# Patient Record
Sex: FEMALE | Race: BLACK OR AFRICAN AMERICAN | NOT HISPANIC OR LATINO
[De-identification: names, ages, dates, MRNs, and addresses within clinical notes are randomized per-mention and may not be internally consistent; named-entity substitution may affect disease eponyms.]

---

## 2017-01-09 ENCOUNTER — APPOINTMENT (OUTPATIENT)
Dept: OTOLARYNGOLOGY | Facility: CLINIC | Age: 62
End: 2017-01-09

## 2017-01-09 VITALS
HEART RATE: 94 BPM | TEMPERATURE: 98.5 F | SYSTOLIC BLOOD PRESSURE: 110 MMHG | OXYGEN SATURATION: 97 % | DIASTOLIC BLOOD PRESSURE: 79 MMHG

## 2017-01-09 DIAGNOSIS — R22.1 LOCALIZED SWELLING, MASS AND LUMP, NECK: ICD-10-CM

## 2017-01-09 DIAGNOSIS — E04.2 NONTOXIC MULTINODULAR GOITER: ICD-10-CM

## 2017-01-17 ENCOUNTER — OUTPATIENT (OUTPATIENT)
Dept: OUTPATIENT SERVICES | Facility: HOSPITAL | Age: 62
LOS: 1 days | End: 2017-01-17
Payer: SELF-PAY

## 2017-01-17 DIAGNOSIS — C73 MALIGNANT NEOPLASM OF THYROID GLAND: ICD-10-CM

## 2017-01-17 PROCEDURE — 88321 CONSLTJ&REPRT SLD PREP ELSWR: CPT

## 2017-01-18 LAB — NON-GYNECOLOGICAL CYTOLOGY STUDY: SIGNIFICANT CHANGE UP

## 2017-01-31 ENCOUNTER — RESULT REVIEW (OUTPATIENT)
Age: 62
End: 2017-01-31

## 2017-01-31 VITALS
RESPIRATION RATE: 16 BRPM | TEMPERATURE: 97 F | HEART RATE: 60 BPM | HEIGHT: 65 IN | SYSTOLIC BLOOD PRESSURE: 130 MMHG | WEIGHT: 167.55 LBS | OXYGEN SATURATION: 96 % | DIASTOLIC BLOOD PRESSURE: 80 MMHG

## 2017-02-01 ENCOUNTER — APPOINTMENT (OUTPATIENT)
Dept: OTOLARYNGOLOGY | Facility: HOSPITAL | Age: 62
End: 2017-02-01

## 2017-02-01 ENCOUNTER — OUTPATIENT (OUTPATIENT)
Dept: OUTPATIENT SERVICES | Facility: HOSPITAL | Age: 62
LOS: 1 days | Discharge: HOME | End: 2017-02-01
Payer: COMMERCIAL

## 2017-02-01 DIAGNOSIS — E04.2 NONTOXIC MULTINODULAR GOITER: ICD-10-CM

## 2017-02-01 DIAGNOSIS — Z90.710 ACQUIRED ABSENCE OF BOTH CERVIX AND UTERUS: Chronic | ICD-10-CM

## 2017-02-01 DIAGNOSIS — J38.7 OTHER DISEASES OF LARYNX: ICD-10-CM

## 2017-02-01 DIAGNOSIS — Z98.890 OTHER SPECIFIED POSTPROCEDURAL STATES: Chronic | ICD-10-CM

## 2017-02-01 DIAGNOSIS — C73 MALIGNANT NEOPLASM OF THYROID GLAND: ICD-10-CM

## 2017-02-01 LAB — PTH-INTACT IO % DIF SERPL: 45 PG/ML — SIGNIFICANT CHANGE UP (ref 8.5–72.5)

## 2017-02-01 PROCEDURE — 60252 REMOVAL OF THYROID: CPT

## 2017-02-01 PROCEDURE — 60500 EXPLORE PARATHYROID GLANDS: CPT | Mod: 59

## 2017-02-01 RX ORDER — HEPARIN SODIUM 5000 [USP'U]/ML
5000 INJECTION INTRAVENOUS; SUBCUTANEOUS EVERY 8 HOURS
Qty: 0 | Refills: 0 | Status: DISCONTINUED | OUTPATIENT
Start: 2017-02-01 | End: 2017-02-02

## 2017-02-01 RX ORDER — BENZOCAINE AND MENTHOL 5; 1 G/100ML; G/100ML
1 LIQUID ORAL
Qty: 0 | Refills: 0 | Status: DISCONTINUED | OUTPATIENT
Start: 2017-02-01 | End: 2017-02-02

## 2017-02-01 RX ORDER — ACETAMINOPHEN WITH CODEINE 300MG-30MG
1 TABLET ORAL EVERY 4 HOURS
Qty: 0 | Refills: 0 | Status: DISCONTINUED | OUTPATIENT
Start: 2017-02-01 | End: 2017-02-01

## 2017-02-01 RX ORDER — ONDANSETRON 8 MG/1
4 TABLET, FILM COATED ORAL EVERY 6 HOURS
Qty: 0 | Refills: 0 | Status: DISCONTINUED | OUTPATIENT
Start: 2017-02-01 | End: 2017-02-02

## 2017-02-01 RX ORDER — ACETAMINOPHEN WITH CODEINE 300MG-30MG
2 TABLET ORAL EVERY 4 HOURS
Qty: 0 | Refills: 0 | Status: DISCONTINUED | OUTPATIENT
Start: 2017-02-01 | End: 2017-02-01

## 2017-02-01 RX ORDER — SODIUM CHLORIDE 9 MG/ML
1000 INJECTION, SOLUTION INTRAVENOUS
Qty: 0 | Refills: 0 | Status: DISCONTINUED | OUTPATIENT
Start: 2017-02-01 | End: 2017-02-02

## 2017-02-01 RX ORDER — ACETAMINOPHEN 500 MG
1000 TABLET ORAL ONCE
Qty: 0 | Refills: 0 | Status: DISCONTINUED | OUTPATIENT
Start: 2017-02-01 | End: 2017-02-01

## 2017-02-01 RX ORDER — HYDROMORPHONE HYDROCHLORIDE 2 MG/ML
0.5 INJECTION INTRAMUSCULAR; INTRAVENOUS; SUBCUTANEOUS
Qty: 0 | Refills: 0 | Status: DISCONTINUED | OUTPATIENT
Start: 2017-02-01 | End: 2017-02-01

## 2017-02-01 RX ORDER — MORPHINE SULFATE 50 MG/1
2 CAPSULE, EXTENDED RELEASE ORAL EVERY 4 HOURS
Qty: 0 | Refills: 0 | Status: DISCONTINUED | OUTPATIENT
Start: 2017-02-01 | End: 2017-02-02

## 2017-02-01 RX ADMIN — HYDROMORPHONE HYDROCHLORIDE 0.5 MILLIGRAM(S): 2 INJECTION INTRAMUSCULAR; INTRAVENOUS; SUBCUTANEOUS at 17:55

## 2017-02-01 RX ADMIN — BENZOCAINE AND MENTHOL 1 LOZENGE: 5; 1 LIQUID ORAL at 22:58

## 2017-02-01 RX ADMIN — MORPHINE SULFATE 2 MILLIGRAM(S): 50 CAPSULE, EXTENDED RELEASE ORAL at 20:01

## 2017-02-01 RX ADMIN — HEPARIN SODIUM 5000 UNIT(S): 5000 INJECTION INTRAVENOUS; SUBCUTANEOUS at 22:52

## 2017-02-01 RX ADMIN — HYDROMORPHONE HYDROCHLORIDE 0.5 MILLIGRAM(S): 2 INJECTION INTRAMUSCULAR; INTRAVENOUS; SUBCUTANEOUS at 18:36

## 2017-02-01 RX ADMIN — MORPHINE SULFATE 2 MILLIGRAM(S): 50 CAPSULE, EXTENDED RELEASE ORAL at 22:20

## 2017-02-01 RX ADMIN — BENZOCAINE AND MENTHOL 1 LOZENGE: 5; 1 LIQUID ORAL at 20:14

## 2017-02-01 RX ADMIN — ONDANSETRON 4 MILLIGRAM(S): 8 TABLET, FILM COATED ORAL at 22:48

## 2017-02-01 NOTE — PROGRESS NOTE ADULT - SUBJECTIVE AND OBJECTIVE BOX
Procedure: total thyroidectomy    Diagnosis/Indication:Papillary thyroid carcinoma    Surgeon: dereje    S: Pt has no complaints. Denies CP, SOB, RAE, calf tenderness. Pain controlled with medication.    O:  T(C): 36.8, Max: 36.8 (02-01 @ 22:00)  T(F): 98.3, Max: 98.3 (02-01 @ 22:00)  HR: 83 (83 - 89)  BP: 126/77 (120/77 - 140/75)  RR: 18 (16 - 18)  SpO2: 95% (95% - 98%)  Wt(kg): --            Gen: NAD, resting comfortably in bed  C/V: NSR  Pulm: Nonlabored breathing, no respiratory distress  neck: soft, no signs of bleeding or hematoms, ronen w/ ss output      A/P: 62yFemale s/p above procedure  Diet: cld  IVF: LR@100  Pain/nausea control  DVT ppx: sqh  Dispo plan:

## 2017-02-01 NOTE — BRIEF OPERATIVE NOTE - OPERATION/FINDINGS
Pyramidal lobe nodule, total thyroidectomy, enlarged right upper parathyroid removed, three other parathyroid glands identified and normal

## 2017-02-02 VITALS — WEIGHT: 164.91 LBS

## 2017-02-02 LAB
ALBUMIN SERPL ELPH-MCNC: 3.3 G/DL — LOW (ref 3.4–5)
ANION GAP SERPL CALC-SCNC: 7 MMOL/L — LOW (ref 9–16)
BUN SERPL-MCNC: 9 MG/DL — SIGNIFICANT CHANGE UP (ref 7–23)
CALCIUM SERPL-MCNC: 8.7 MG/DL — SIGNIFICANT CHANGE UP (ref 8.5–10.5)
CHLORIDE SERPL-SCNC: 102 MMOL/L — SIGNIFICANT CHANGE UP (ref 96–108)
CO2 SERPL-SCNC: 28 MMOL/L — SIGNIFICANT CHANGE UP (ref 22–31)
CREAT SERPL-MCNC: 0.5 MG/DL — SIGNIFICANT CHANGE UP (ref 0.5–1.3)
GLUCOSE SERPL-MCNC: 98 MG/DL — SIGNIFICANT CHANGE UP (ref 70–99)
HCT VFR BLD CALC: 33.2 % — LOW (ref 34.5–45)
HGB BLD-MCNC: 11.3 G/DL — LOW (ref 11.5–15.5)
MAGNESIUM SERPL-MCNC: 1.8 MG/DL — SIGNIFICANT CHANGE UP (ref 1.6–2.4)
MCHC RBC-ENTMCNC: 29.9 PG — SIGNIFICANT CHANGE UP (ref 27–34)
MCHC RBC-ENTMCNC: 34 G/DL — SIGNIFICANT CHANGE UP (ref 32–36)
MCV RBC AUTO: 87.8 FL — SIGNIFICANT CHANGE UP (ref 80–100)
PHOSPHATE SERPL-MCNC: 2.8 MG/DL — SIGNIFICANT CHANGE UP (ref 2.5–4.5)
PLATELET # BLD AUTO: 192 K/UL — SIGNIFICANT CHANGE UP (ref 150–400)
POTASSIUM SERPL-MCNC: 3.6 MMOL/L — SIGNIFICANT CHANGE UP (ref 3.5–5.3)
POTASSIUM SERPL-SCNC: 3.6 MMOL/L — SIGNIFICANT CHANGE UP (ref 3.5–5.3)
PTH-INTACT IO % DIF SERPL: 63.1 PG/ML — SIGNIFICANT CHANGE UP (ref 8.5–72.5)
RBC # BLD: 3.78 M/UL — LOW (ref 3.8–5.2)
RBC # FLD: 12.4 % — SIGNIFICANT CHANGE UP (ref 10.3–16.9)
SODIUM SERPL-SCNC: 137 MMOL/L — SIGNIFICANT CHANGE UP (ref 135–145)
WBC # BLD: 8.4 K/UL — SIGNIFICANT CHANGE UP (ref 3.8–10.5)
WBC # FLD AUTO: 8.4 K/UL — SIGNIFICANT CHANGE UP (ref 3.8–10.5)

## 2017-02-02 PROCEDURE — 82040 ASSAY OF SERUM ALBUMIN: CPT

## 2017-02-02 PROCEDURE — 88305 TISSUE EXAM BY PATHOLOGIST: CPT

## 2017-02-02 PROCEDURE — 83735 ASSAY OF MAGNESIUM: CPT

## 2017-02-02 PROCEDURE — 83970 ASSAY OF PARATHORMONE: CPT

## 2017-02-02 PROCEDURE — 88331 PATH CONSLTJ SURG 1 BLK 1SPC: CPT

## 2017-02-02 PROCEDURE — 36415 COLL VENOUS BLD VENIPUNCTURE: CPT

## 2017-02-02 PROCEDURE — 88307 TISSUE EXAM BY PATHOLOGIST: CPT

## 2017-02-02 PROCEDURE — 84100 ASSAY OF PHOSPHORUS: CPT

## 2017-02-02 PROCEDURE — 80048 BASIC METABOLIC PNL TOTAL CA: CPT

## 2017-02-02 PROCEDURE — 60252 REMOVAL OF THYROID: CPT

## 2017-02-02 PROCEDURE — 85027 COMPLETE CBC AUTOMATED: CPT

## 2017-02-02 RX ORDER — ACETAMINOPHEN WITH CODEINE 300MG-30MG
1 TABLET ORAL
Qty: 20 | Refills: 0 | OUTPATIENT
Start: 2017-02-02

## 2017-02-02 RX ORDER — ACETAMINOPHEN 500 MG
2 TABLET ORAL
Qty: 0 | Refills: 0 | COMMUNITY
Start: 2017-02-02

## 2017-02-02 RX ORDER — AZITHROMYCIN 500 MG/1
1 TABLET, FILM COATED ORAL
Qty: 3 | Refills: 0 | OUTPATIENT
Start: 2017-02-02 | End: 2017-02-05

## 2017-02-02 RX ORDER — ACETAMINOPHEN 500 MG
650 TABLET ORAL EVERY 6 HOURS
Qty: 0 | Refills: 0 | Status: DISCONTINUED | OUTPATIENT
Start: 2017-02-02 | End: 2017-02-02

## 2017-02-02 RX ADMIN — Medication 650 MILLIGRAM(S): at 07:10

## 2017-02-02 RX ADMIN — HEPARIN SODIUM 5000 UNIT(S): 5000 INJECTION INTRAVENOUS; SUBCUTANEOUS at 06:06

## 2017-02-02 RX ADMIN — Medication 650 MILLIGRAM(S): at 13:15

## 2017-02-02 RX ADMIN — SODIUM CHLORIDE 100 MILLILITER(S): 9 INJECTION, SOLUTION INTRAVENOUS at 07:26

## 2017-02-02 RX ADMIN — Medication 650 MILLIGRAM(S): at 12:44

## 2017-02-02 RX ADMIN — Medication 650 MILLIGRAM(S): at 06:06

## 2017-02-02 RX ADMIN — MORPHINE SULFATE 2 MILLIGRAM(S): 50 CAPSULE, EXTENDED RELEASE ORAL at 00:50

## 2017-02-02 RX ADMIN — MORPHINE SULFATE 2 MILLIGRAM(S): 50 CAPSULE, EXTENDED RELEASE ORAL at 00:35

## 2017-02-02 NOTE — DISCHARGE NOTE ADULT - ADDITIONAL INSTRUCTIONS
1) Please take Z- pack as directed.  2) Please Tylenol #3 every 4 to 6 hours as needed for the pain.  3) Please take Citracal 2 tabs three times a day.

## 2017-02-02 NOTE — DIETITIAN INITIAL EVALUATION ADULT. - FACTORS AFF FOOD INTAKE
vomiting/persistent nausea/persistent lack of appetite vomiting/persistent lack of appetite/difficulty swallowing/persistent nausea

## 2017-02-02 NOTE — DIETITIAN INITIAL EVALUATION ADULT. - NUTRITIONGOAL OUTCOME1
Pt to consume >75% of meals/supplements during hospitalization 1. Pt to consume >75% of meals/supplements during hospitalization

## 2017-02-02 NOTE — DIETITIAN INITIAL EVALUATION ADULT. - NS AS NUTRI INTERV MEDICAL AND FOOD SUPPLEMENTS
Commercial beverage/Ensure Plus 2x/day (provides 700 kcal, 26 g pro) Commercial beverage/Consider ONS to increase PO intake based on SLP recommendations Commercial beverage/Consider ONS to increase kcal/protein PO

## 2017-02-02 NOTE — DIETITIAN INITIAL EVALUATION ADULT. - ENERGY NEEDS
5'5", wt = 76kg (2/1/17), BMI = 27.8, IBW = 56.8kg, 134% IBW    Utilized IBW due to pt over 120% IBW. Estimated energy requirements based on post surgical status. 5'5", wt = 76kg (2/1/17), BMI = 27.8, IBW = 56.8kg, 134% IBW  Utilized IBW due to pt over 120% IBW. Estimated energy requirements based on post surgical status.

## 2017-02-02 NOTE — DISCHARGE NOTE ADULT - CARE PLAN
Principal Discharge DX:	Papillary carcinoma of thyroid  Goal:	Recovery  Instructions for follow-up, activity and diet:	- Please do not remove dressing in 48 hours  Follow up with Dr. Farr  in 1 week. Call the office at [phone] to schedule your appointment. You may shower; soap and water over incision sites. Do not scrub. Pat dry when done. No tub bathing or swimming until cleared. Keep incision sites out of the sun as scars will darken. No heavy lifting (>10lbs) or strenuous exercise. Call the office if you experience increasing incisional pain, nausea, vomiting, temperature >100.4F, bleeding , discharge and or foul odor of the incision site Principal Discharge DX:	Papillary carcinoma of thyroid  Goal:	Recovery  Instructions for follow-up, activity and diet:	- Please do not remove dressing in 48 hours  Follow up with Dr. Farr  in 1 week. Call the office at [phone] to schedule your appointment. You may shower; soap and water over incision sites. Do not scrub. Pat dry when done. No tub bathing or swimming until cleared. Keep incision sites out of the sun as scars will darken. No heavy lifting (>10lbs) or strenuous exercise. Call the office if you experience increasing incisional pain, nausea, vomiting, temperature >100.4F, bleeding , discharge and or foul odor of the incision site

## 2017-02-02 NOTE — DISCHARGE NOTE ADULT - PLAN OF CARE
Recovery - Please do not remove dressing in 48 hours  Follow up with Dr. Farr  in 1 week. Call the office at [phone] to schedule your appointment. You may shower; soap and water over incision sites. Do not scrub. Pat dry when done. No tub bathing or swimming until cleared. Keep incision sites out of the sun as scars will darken. No heavy lifting (>10lbs) or strenuous exercise. Call the office if you experience increasing incisional pain, nausea, vomiting, temperature >100.4F, bleeding , discharge and or foul odor of the incision site

## 2017-02-02 NOTE — DISCHARGE NOTE ADULT - MEDICATION SUMMARY - MEDICATIONS TO TAKE
I will START or STAY ON the medications listed below when I get home from the hospital:    acetaminophen 325 mg oral tablet  -- 2 tab(s) by mouth every 6 hours, As needed, Severe Pain (7 - 10)  -- Indication: For Post operative pain     Tylenol with Codeine #3 300 mg-30 mg oral tablet  -- 1 tab(s) by mouth every 6 hours, As Needed -for severe pain MDD:6  -- Caution federal law prohibits the transfer of this drug to any person other  than the person for whom it was prescribed.  May cause drowsiness.  Alcohol may intensify this effect.  Use care when operating dangerous machinery.  This product contains acetaminophen.  Do not use  with any other product containing acetaminophen to prevent possible liver damage.  Using more of this medication than prescribed may cause serious breathing problems.    -- Indication: For Post operative pain    Azithromycin 3 Day Dose Pack 500 mg oral tablet  -- 1 tab(s) by mouth once a day MDD:1  -- Do not take dairy products, antacids, or iron preparations within one hour of this medication.  Finish all this medication unless otherwise directed by prescriber.    -- Indication: For Post operative antibotics     Citracal Petites 200 mg-250 intl units oral tablet  -- 2 tab(s) by mouth 3 times a day MDD:6  -- Indication: For Post operative

## 2017-02-02 NOTE — DIETITIAN INITIAL EVALUATION ADULT. - NUTRITION INTERVENTION
Vitamin/Medical Food Supplements/Nutrition Education/Collaboration and Referral of Nutrition Care/Meals and Snack Vitamin/Medical Food Supplements/Nutrition Education

## 2017-02-02 NOTE — DIETITIAN INITIAL EVALUATION ADULT. - NS AS NUTRI INTERV ED CONTENT
Purpose of the nutrition education/Discussed with pt the importance of consuming foods high in calcium and different food sources with calcium

## 2017-02-02 NOTE — DISCHARGE NOTE ADULT - PATIENT PORTAL LINK FT
“You can access the FollowHealth Patient Portal, offered by [facility], by registering with the following website: [email or web address]”

## 2017-02-02 NOTE — PROGRESS NOTE ADULT - ASSESSMENT
63 y/o female with papillary thyroid carcinoma.  Regular diet  IVF  Pain/ nausea control PRN  OOB/AMB SCD'S  Incentive spirometer   removal of SHERICE drain  Discharge home today

## 2017-02-02 NOTE — DISCHARGE NOTE ADULT - HOSPITAL COURSE
62 year old female with papillary thyroid carcinoma of the thyroid her for SDA. Patient  s/p total thyroidectomy. Pt tolerated the procedure well. At time of discharge, pt was tolerating a  diet, and  pain well controlled. Plan is to follow up with Dr. Farr in the office.

## 2017-02-02 NOTE — PROGRESS NOTE ADULT - SUBJECTIVE AND OBJECTIVE BOX
24 HOUR EVENTS: POC WNL, VSS , pain well controlled     STATUS POST:  total thyroidectomy    POST OPERATIVE DAY #: 1    SUBJECTIVE: Patient reports pain improved   Flatus: [X] YES [] NO             Bowel Movement: [ ] YES [X ] NO  Pain (0-10):            Pain Control Adequate: [X ] YES [ ] NO  Nausea: [ ] YES [X ] NO            Vomiting: [ ] YES [X ] NO  Diarrhea: [ ] YES [X ] NO         Constipation: [ ] YES [X ] NO     Chest Pain: [ ] YES [X ] NO    SOB:  [ ] YES [X ] NO    heparin  Injectable 5000Unit(s) SubCutaneous every 8 hours      Vital Signs Last 24 Hrs  T(C): 36.7, Max: 36.8 (02-01 @ 22:00)  T(F): 98, Max: 98.3 (02-01 @ 22:00)  HR: 67 (67 - 89)  BP: 126/80 (95/62 - 140/75)  BP(mean): --  RR: 16 (16 - 18)  SpO2: 96% (95% - 98%)  I&O's Detail  I & Os for 24h ending 02 Feb 2017 07:00  =============================================  IN:    lactated ringers.: 1300 ml    Oral Fluid: 90 ml    Total IN: 1390 ml  ---------------------------------------------  OUT:    Voided: 450 ml    Bulb: 35 ml    Total OUT: 485 ml  ---------------------------------------------  Total NET: 905 ml    I & Os for current day (as of 02 Feb 2017 13:39)  =============================================  IN:    Total IN: 0 ml  ---------------------------------------------  OUT:    Voided: 300 ml    Bulb: 20 ml    Total OUT: 320 ml  ---------------------------------------------  Total NET: -320 ml      General: NAD, resting comfortably in bed  C/V: NSR  Pulm: Nonlabored breathing, no respiratory distress  Abd: soft, NT/ND.  Extrem: WWP, no edema, SCDs in place  Drains: SHERICE drain serosanguineous         LABS:                        11.3   8.4   )-----------( 192      ( 02 Feb 2017 12:06 )             33.2     02 Feb 2017 12:06    137    |  102    |  9      ----------------------------<  98     3.6     |  28     |  0.50     Ca    8.7        02 Feb 2017 12:06  Phos  2.8       02 Feb 2017 12:06  Mg     1.8       02 Feb 2017 12:06    TPro  x      /  Alb  3.3    /  TBili  x      /  DBili  x      /  AST  x      /  ALT  x      /  AlkPhos  x      02 Feb 2017 12:06          RADIOLOGY & ADDITIONAL STUDIES:

## 2017-02-02 NOTE — DIETITIAN INITIAL EVALUATION ADULT. - ORAL INTAKE PTA
good/Pt reports good appetite, consumption of 3 meals with several snacks daily. Takes MVI gummy PTA.

## 2017-02-02 NOTE — DIETITIAN INITIAL EVALUATION ADULT. - OTHER INFO
Pt seen for difficulty chewing/swallowing consult. Pt s/p total thyroidectomy and enlarged right upper parathyroid removal. Pt c/o lack of appetite s/p surgery with small amount of pain while swallowing. Reports persistent nausea- vomited 1x this morning. Pt consumed a bite of a banana for breakfast, which made her nauseous. Pt tried to sip on water which caused her pain and belching. Pt requested yogurt on her tray and will try eating when less nauseous. Denies C/D. Last BM 2/1. Skin- no edema, surgical incision. Reported shellfish allergy and eczema reaction to wheat products. Pt seen for difficulty chewing/swallowing consult. Pt s/p total thyroidectomy and enlarged right upper parathyroid removal (2/1). Pt c/o lack of appetite s/p surgery with small amount of pain while swallowing. Reports persistent nausea- vomited 1x this morning. Pt consumed a bite of a banana for breakfast, which made her nauseous. Pt tried to sip on water which caused her pain and belching. Pt requests yogurt on her tray and will try eating when less nauseous. Rec providing supplement prn--discussed w/ MD. Denies C/D. Last BM 2/1. Skin- no edema, surgical incision. Reported shellfish allergy and eczema reaction to wheat products. Pt seen for difficulty chewing/swallowing consult. Pt s/p total thyroidectomy and enlarged right upper parathyroid removal (2/1). Pt c/o lack of appetite s/p surgery with small amount of pain while swallowing. Reports persistent nausea- vomited 1x this morning. Pt consumed a bite of a banana for breakfast, which made her nauseous. Pt tried to sip on water which caused her pain and belching. Pt requests yogurt on her tray and will try eating when less nauseous. Rec providing supplement prn--discussed w/ MD. Denies C/D. Last BM 2/1. Skin- no edema, surgical incision. Reported shellfish allergy and rash reaction to wheat products.

## 2017-02-07 LAB — SURGICAL PATHOLOGY STUDY: SIGNIFICANT CHANGE UP

## 2017-02-09 RX ORDER — ACETAMINOPHEN AND CODEINE 300; 30 MG/1; MG/1
300-30 TABLET ORAL
Qty: 30 | Refills: 0 | Status: DISCONTINUED | COMMUNITY
Start: 2017-01-31 | End: 2017-02-09

## 2017-02-09 RX ORDER — CALCITRIOL 0.5 UG/1
0.5 CAPSULE, LIQUID FILLED ORAL
Qty: 5 | Refills: 0 | Status: DISCONTINUED | COMMUNITY
Start: 2017-01-31 | End: 2017-02-09

## 2017-02-09 RX ORDER — AZITHROMYCIN DIHYDRATE 500 MG/1
500 TABLET, FILM COATED ORAL DAILY
Qty: 1 | Refills: 0 | Status: COMPLETED | COMMUNITY
Start: 2017-01-31 | End: 2017-02-09

## 2017-02-13 ENCOUNTER — LABORATORY RESULT (OUTPATIENT)
Age: 62
End: 2017-02-13

## 2017-02-13 ENCOUNTER — APPOINTMENT (OUTPATIENT)
Dept: OTOLARYNGOLOGY | Facility: CLINIC | Age: 62
End: 2017-02-13

## 2017-02-13 VITALS
OXYGEN SATURATION: 94 % | TEMPERATURE: 97.6 F | HEART RATE: 52 BPM | SYSTOLIC BLOOD PRESSURE: 113 MMHG | DIASTOLIC BLOOD PRESSURE: 71 MMHG

## 2017-03-15 ENCOUNTER — APPOINTMENT (OUTPATIENT)
Dept: ENDOCRINOLOGY | Facility: CLINIC | Age: 62
End: 2017-03-15

## 2017-03-15 VITALS
SYSTOLIC BLOOD PRESSURE: 113 MMHG | WEIGHT: 169.06 LBS | HEART RATE: 78 BPM | BODY MASS INDEX: 27.83 KG/M2 | DIASTOLIC BLOOD PRESSURE: 77 MMHG | HEIGHT: 65.35 IN

## 2017-03-16 ENCOUNTER — APPOINTMENT (OUTPATIENT)
Dept: OTOLARYNGOLOGY | Facility: CLINIC | Age: 62
End: 2017-03-16

## 2017-03-17 ENCOUNTER — MEDICATION RENEWAL (OUTPATIENT)
Age: 62
End: 2017-03-17

## 2017-03-17 LAB
T4 FREE SERPL-MCNC: 2.1 NG/DL
THYROGLOB AB SERPL-ACNC: <20 IU/ML
THYROGLOB SERPL-MCNC: 0.32 NG/ML
TSH SERPL-ACNC: 0.1 UIU/ML

## 2017-05-08 ENCOUNTER — APPOINTMENT (OUTPATIENT)
Dept: OTOLARYNGOLOGY | Facility: CLINIC | Age: 62
End: 2017-05-08

## 2017-05-08 VITALS — SYSTOLIC BLOOD PRESSURE: 112 MMHG | OXYGEN SATURATION: 100 % | HEART RATE: 74 BPM | DIASTOLIC BLOOD PRESSURE: 76 MMHG

## 2017-06-06 ENCOUNTER — CLINICAL ADVICE (OUTPATIENT)
Age: 62
End: 2017-06-06

## 2017-12-01 ENCOUNTER — RX RENEWAL (OUTPATIENT)
Age: 62
End: 2017-12-01

## 2018-01-02 ENCOUNTER — RX RENEWAL (OUTPATIENT)
Age: 63
End: 2018-01-02

## 2018-02-01 ENCOUNTER — RX RENEWAL (OUTPATIENT)
Age: 63
End: 2018-02-01

## 2018-03-02 ENCOUNTER — RX RENEWAL (OUTPATIENT)
Age: 63
End: 2018-03-02

## 2018-04-03 ENCOUNTER — RX RENEWAL (OUTPATIENT)
Age: 63
End: 2018-04-03

## 2018-04-03 RX ORDER — LEVOTHYROXINE SODIUM 0.11 MG/1
112 TABLET ORAL DAILY
Qty: 30 | Refills: 0 | Status: ACTIVE | COMMUNITY
Start: 2017-02-09 | End: 1900-01-01

## 2018-04-16 ENCOUNTER — APPOINTMENT (OUTPATIENT)
Dept: OTOLARYNGOLOGY | Facility: CLINIC | Age: 63
End: 2018-04-16
Payer: SELF-PAY

## 2018-04-16 VITALS
OXYGEN SATURATION: 97 % | DIASTOLIC BLOOD PRESSURE: 83 MMHG | TEMPERATURE: 98.3 F | SYSTOLIC BLOOD PRESSURE: 128 MMHG | HEART RATE: 74 BPM | RESPIRATION RATE: 16 BRPM

## 2018-04-16 PROCEDURE — 31575 DIAGNOSTIC LARYNGOSCOPY: CPT

## 2018-04-16 PROCEDURE — 99214 OFFICE O/P EST MOD 30 MIN: CPT | Mod: 25

## 2018-04-25 DIAGNOSIS — I77.811 ABDOMINAL AORTIC ECTASIA: ICD-10-CM

## 2018-12-13 ENCOUNTER — RX RENEWAL (OUTPATIENT)
Age: 63
End: 2018-12-13

## 2018-12-13 RX ORDER — LEVOTHYROXINE SODIUM 0.11 MG/1
112 TABLET ORAL DAILY
Qty: 60 | Refills: 3 | Status: ACTIVE | COMMUNITY
Start: 2018-04-11 | End: 1900-01-01

## 2019-01-04 ENCOUNTER — RX RENEWAL (OUTPATIENT)
Age: 64
End: 2019-01-04

## 2019-01-04 RX ORDER — LEVOTHYROXINE SODIUM 0.11 MG/1
112 TABLET ORAL DAILY
Qty: 60 | Refills: 0 | Status: ACTIVE | COMMUNITY
Start: 2019-01-04 | End: 1900-01-01

## 2019-01-18 PROBLEM — J45.909 UNSPECIFIED ASTHMA, UNCOMPLICATED: Chronic | Status: ACTIVE | Noted: 2017-02-01

## 2019-01-29 ENCOUNTER — LABORATORY RESULT (OUTPATIENT)
Age: 64
End: 2019-01-29

## 2019-01-29 ENCOUNTER — APPOINTMENT (OUTPATIENT)
Dept: OTOLARYNGOLOGY | Facility: CLINIC | Age: 64
End: 2019-01-29
Payer: SELF-PAY

## 2019-01-29 VITALS — OXYGEN SATURATION: 95 % | DIASTOLIC BLOOD PRESSURE: 71 MMHG | SYSTOLIC BLOOD PRESSURE: 109 MMHG | HEART RATE: 75 BPM

## 2019-01-29 DIAGNOSIS — R49.9 UNSPECIFIED VOICE AND RESONANCE DISORDER: ICD-10-CM

## 2019-01-29 PROCEDURE — 99214 OFFICE O/P EST MOD 30 MIN: CPT | Mod: 25

## 2019-01-29 PROCEDURE — 31575 DIAGNOSTIC LARYNGOSCOPY: CPT

## 2019-01-29 NOTE — HISTORY OF PRESENT ILLNESS
[de-identified] : Jessica is a generally healthy 61-year-old female family  who noted an anterior lump in her neck ~ 5-7 years ago with slow growth since.  She had an ultrasound then and told it was not large and could be followed.   After it was more visible this year, she pointed this out to her primary care doctor who ordered an ultrasound of the thyroid gland and numerous nodules were identified within the isthmus and left lobe. She complained of a foreign body sensation in the throat on the right side that causes an intermittent choking sensation with obstruction to breathing that caused nocturnal arousals at least three times over this past year.  Her breathing improved after changing her neck position. She denies regurgitation of food or GERD/pyrosis. Her thyroid gland is only marginally enlarged on ultrasound with the left lobe measuring 5.2 CM in sagittal height and the right lobe 4.6 CM in sagittal height. There are 2 nodules in the isthmus of the gland measuring 1.6 and 1.4 CM respectively without suspicious features. There are 3 dominant nodules in the left thyroid lobe ranging in size from 0.9 to 1.5 CM again without suspicious features. The right lobe only contains one nodule measuring 6 mm greatest dimension and without suspicious features on ultrasound. There are no morphologically suspicious or enlarged cervical lymph nodes. Her thyroid function studies were normal and she does not have antithyroid antibodies. There is no family history of thyroid cancer or known radiation exposures. Currently she denies dysphagia, shortness of breath at rest, significant neck pressure, pain, or recent voice changes.\par  [FreeTextEntry1] : Jessica returns today for a follow up visit after a total thyroidectomy for a 2.1 cm pyramidal lobe nodule on 02/01/17.  She also had an incidental parathyroid adenoma removed.  In March, her Tg level was 0.32 with undetectable Tg antibodies and a suppressed TSH of 0.1. However, she was feeling hyperthyroid and exhausted and her LT4 dose lowered to 112 mcg.  She still has intermittent left neck muscle spasm and pain without any defined triggers.  More recently she complains of a sense of pressure and aching in her parotid glands in both cheeks that responds to massage intermittently (pain scale 6/10).   Her surgical pathology revealed a classic papillary thyroid carcinoma measuring 2.1 CM with no extrathyroidal extension or lymphovascular invasion. Margins were negative for tumor and 2 central compartment and one perithyroidal  lymph nodes negative for carcinoma. In addition she was found to have a hypercellular, enlarged 1.3 cm right upper parathyroid gland consistent with a probable parathyroid adenoma that was not suspected preoperatively. She did not have PARIS and was monitored by Dr. Mo but does not want to continue. She still has slight difficulty getting her upper registers when singing. She does have intermittent GERD with regurgitation.  She takes Zantac PRN.  She also has a history of bruxism. Her weight is stable.  She is now on D3 2,000 IU. She still has not had a f/u neck and abdominal ultrasound and this will be re-ordered today.

## 2019-01-29 NOTE — CONSULT LETTER
[Dear  ___] : Dear  [unfilled], [Consult Letter:] : I had the pleasure of evaluating your patient, [unfilled]. [Please see my note below.] : Please see my note below. [Consult Closing:] : Thank you very much for allowing me to participate in the care of this patient.  If you have any questions, please do not hesitate to contact me. [Sincerely,] : Sincerely, [Ja Farr M.D., FACS, DORI] : Ja Farr M.D., FACS, [facility] [Director, [facility]] : Director, [facility] [[facility]] : [facility] [[facility]] : [facility]  [Certified in Neck Ultrasound/ ECNU & AIUM] : Certified in Neck Ultrasound/ ECNU & AIUM [] :  [Otolaryngology/Head & Neck Surgery] : Otolaryngology/Head & Neck Surgery [[facility]] : [facility]

## 2019-01-29 NOTE — REASON FOR VISIT
[FreeTextEntry2] : a follow up visit after total thyroidectomy for papillary thyroid carcinoma. [FreeTextEntry1] : PCP is Eleonora Hodge MD, Endocrinologist is Dr. Mo.

## 2019-01-29 NOTE — PROCEDURE
[Image(s) Captured] : image(s) captured and filed [Hoarseness] : hoarseness not clearly evaluated by indirect laryngoscopy [Globus] : globus [Topical Lidocaine] : topical lidocaine [Flexible Endoscope] : examined with the flexible endoscope [Serial Number: ___] : Serial Number: [unfilled] [de-identified] : The nasal septum is minimally deviated to the left. The inferior turbinates are congested. There are no masses or polyps and the nasal mucosa and secretions are normal. The choanae and posterior nasopharynx are normal without masses or drainage. The Eustachian tube orifices appear patent. The pharynx, including the posterior and lateral pharyngeal walls, the vallecula and base of tongue are normal without ulcerations, lesions or masses. The hypopharynx including the pyriform sinuses open well without pooling of secretions, mucosal lesions or masses. The supraglottic larynx including the epiglottis, petiole, glossoepiglottic folds and pharyngoepiglottic folds are normal without mucosal lesions, ulcerations or masses. The glottis reveals normal false vocal folds. The true vocal folds are glistening white, tense and of equal length, without paralysis, having symmetric mobility on adduction and abduction. There are no mucosal lesions, nodules, cysts, erythroplasia or leukoplakia. The posterior cricoid area has healthy pink mucosa in the interarytenoid area and esophageal inlet. There is slight thickening/edema of the interarytenoid mucosa suggestive of posterior laryngitis from laryngopharyngeal acid reflux disease. The trachea is clear without narrowing, deviation or lesions. \par  [de-identified] : s/p total thyroidectomy for PTC

## 2019-02-01 LAB
ALBUMIN SERPL ELPH-MCNC: 3.8 G/DL
ALP BLD-CCNC: 76 U/L
ALT SERPL-CCNC: 16 U/L
ANION GAP SERPL CALC-SCNC: 10 MMOL/L
AST SERPL-CCNC: 20 U/L
BILIRUB SERPL-MCNC: 1 MG/DL
BUN SERPL-MCNC: 12 MG/DL
CA-I SERPL-SCNC: 1.24 MMOL/L
CALCIUM SERPL-MCNC: 9.5 MG/DL
CALCIUM SERPL-MCNC: 9.5 MG/DL
CHLORIDE SERPL-SCNC: 104 MMOL/L
CO2 SERPL-SCNC: 29 MMOL/L
CREAT SERPL-MCNC: 0.8 MG/DL
GLUCOSE SERPL-MCNC: 84 MG/DL
PARATHYROID HORMONE INTACT: 42 PG/ML
PHOSPHATE SERPL-MCNC: 3.1 MG/DL
POTASSIUM SERPL-SCNC: 3.9 MMOL/L
PROT SERPL-MCNC: 6.8 G/DL
SODIUM SERPL-SCNC: 143 MMOL/L
THYROGLOB AB SERPL-ACNC: <20 IU/ML
THYROGLOB SERPL-MCNC: <0.2 NG/ML

## 2019-02-12 ENCOUNTER — APPOINTMENT (OUTPATIENT)
Dept: OTOLARYNGOLOGY | Facility: CLINIC | Age: 64
End: 2019-02-12

## 2019-02-14 LAB
T4 FREE SERPL-MCNC: 1.9 NG/DL
TSH SERPL-ACNC: 0.18 UIU/ML

## 2019-03-04 RX ORDER — LEVOTHYROXINE SODIUM 0.11 MG/1
112 TABLET ORAL DAILY
Qty: 60 | Refills: 3 | Status: ACTIVE | COMMUNITY
Start: 2019-03-04 | End: 1900-01-01

## 2019-09-03 ENCOUNTER — APPOINTMENT (OUTPATIENT)
Dept: OTOLARYNGOLOGY | Facility: CLINIC | Age: 64
End: 2019-09-03
Payer: SELF-PAY

## 2019-09-03 ENCOUNTER — LABORATORY RESULT (OUTPATIENT)
Age: 64
End: 2019-09-03

## 2019-09-03 VITALS
OXYGEN SATURATION: 98 % | HEART RATE: 59 BPM | TEMPERATURE: 98.1 F | DIASTOLIC BLOOD PRESSURE: 79 MMHG | SYSTOLIC BLOOD PRESSURE: 141 MMHG

## 2019-09-03 DIAGNOSIS — M54.2 CERVICALGIA: ICD-10-CM

## 2019-09-03 DIAGNOSIS — E89.0 POSTPROCEDURAL HYPOTHYROIDISM: ICD-10-CM

## 2019-09-03 DIAGNOSIS — C73 MALIGNANT NEOPLASM OF THYROID GLAND: ICD-10-CM

## 2019-09-03 DIAGNOSIS — J04.0 ACUTE LARYNGITIS: ICD-10-CM

## 2019-09-03 DIAGNOSIS — K11.9 DISEASE OF SALIVARY GLAND, UNSPECIFIED: ICD-10-CM

## 2019-09-03 DIAGNOSIS — K21.9 GASTRO-ESOPHAGEAL REFLUX DISEASE W/OUT ESOPHAGITIS: ICD-10-CM

## 2019-09-03 DIAGNOSIS — K21.9 ACUTE LARYNGITIS: ICD-10-CM

## 2019-09-03 PROCEDURE — 31575 DIAGNOSTIC LARYNGOSCOPY: CPT

## 2019-09-03 PROCEDURE — 99214 OFFICE O/P EST MOD 30 MIN: CPT | Mod: 25

## 2019-09-03 NOTE — PROCEDURE
[Image(s) Captured] : image(s) captured and filed [Hoarseness] : hoarseness not clearly evaluated by indirect laryngoscopy [Globus] : globus [Topical Lidocaine] : topical lidocaine [Flexible Endoscope] : examined with the flexible endoscope [Gag Reflex] : gag reflex preventing mirror examination [Unable to Cooperate with Mirror] : patient unable to cooperate with mirror [Serial Number: ___] : Serial Number: [unfilled] [de-identified] : The nasal septum is minimally deviated to the left. The inferior turbinates are congested. There are no masses or polyps and the nasal mucosa and secretions are normal. The choanae and posterior nasopharynx are normal without masses or drainage. The Eustachian tube orifices appear patent. The pharynx, including the posterior and lateral pharyngeal walls, the vallecula and base of tongue are normal without ulcerations, lesions or masses. The hypopharynx including the pyriform sinuses open well without pooling of secretions, mucosal lesions or masses. The supraglottic larynx including the epiglottis, petiole, glossoepiglottic folds and pharyngoepiglottic folds are normal without mucosal lesions, ulcerations or masses. The glottis reveals normal false vocal folds. The true vocal folds are glistening white, tense and of equal length, without paralysis, having symmetric mobility on adduction and abduction. There are no mucosal lesions, nodules, cysts, erythroplasia or leukoplakia. The posterior cricoid area has healthy pink mucosa in the interarytenoid area and esophageal inlet. There is moderate thickening/edema of the interarytenoid mucosa suggestive of posterior laryngitis from laryngopharyngeal acid reflux disease. The trachea is clear without narrowing, deviation or lesions. \par  [de-identified] : s/p total thyroidectomy for thyroid cancer, neck spasms, GERD

## 2019-09-03 NOTE — HISTORY OF PRESENT ILLNESS
[de-identified] : Jessica is a generally healthy 61-year-old female family  who noted an anterior lump in her neck ~ 5-7 years ago with slow growth since.  She had an ultrasound then and told it was not large and could be followed.   After it was more visible this year, she pointed this out to her primary care doctor who ordered an ultrasound of the thyroid gland and numerous nodules were identified within the isthmus and left lobe. She complained of a foreign body sensation in the throat on the right side that causes an intermittent choking sensation with obstruction to breathing that caused nocturnal arousals at least three times over this past year.  Her breathing improved after changing her neck position. She denies regurgitation of food or GERD/pyrosis. Her thyroid gland is only marginally enlarged on ultrasound with the left lobe measuring 5.2 CM in sagittal height and the right lobe 4.6 CM in sagittal height. There are 2 nodules in the isthmus of the gland measuring 1.6 and 1.4 CM respectively without suspicious features. There are 3 dominant nodules in the left thyroid lobe ranging in size from 0.9 to 1.5 CM again without suspicious features. The right lobe only contains one nodule measuring 6 mm greatest dimension and without suspicious features on ultrasound. There are no morphologically suspicious or enlarged cervical lymph nodes. Her thyroid function studies were normal and she does not have antithyroid antibodies. There is no family history of thyroid cancer or known radiation exposures. Currently she denies dysphagia, shortness of breath at rest, significant neck pressure, pain, or recent voice changes.\par  [FreeTextEntry1] : Jessica returns today for a follow up visit after a total thyroidectomy for a 2.1 cm pyramidal lobe classic papillary thyroid carcinoma on 02/01/17.  She also had an incidental parathyroid adenoma removed.  In March, her Tg level was 0.32 with undetectable Tg antibodies and a suppressed TSH of 0.1. However, she was feeling hyperthyroid and exhausted and her LT4 dose lowered to 112 mcg.  She still has intermittent left neck muscle spasm and pain without any defined triggers 2-3 x per week lasting variable amounts of time up to 15 minutes. There are some weeks where there is no symptom.  She complains of a sense of pressure and aching in her parotid glands in both cheeks only when eating sour food that responds to massage intermittently (pain scale 3/10).   Her surgical pathology revealed a classic papillary thyroid carcinoma measuring 2.1 CM with no extrathyroidal extension or lymphovascular invasion. Margins were negative for tumor and 2 central compartment and one perithyroidal  lymph nodes negative for carcinoma. In addition she was found to have a hypercellular, enlarged 1.3 cm right upper parathyroid gland consistent with a probable parathyroid adenoma that was not suspected preoperatively. She did not have PARIS and was monitored by Dr. Mo but does not want to continue. She has less difficulty getting her upper registers when singing now. She does have intermittent GERD with regurgitation.  She takes Zantac PRN.  She also has a history of bruxism. She gained  4-5 pounds over this past year and decreased energy levels.  She is now on D3 2,000 IU. A f/u neck and an abdominal ultrasound showed no concern for a aortic aneurysm in the abdomen. A neck thyroid ultrasound was suggestive of a 5 mm thyroid tissue remnant in the left thyroidectomy bed.  She has not had a f/u US since last February or thyroglobulin  levels.  She has not yet found another Endocrinologist.

## 2019-09-03 NOTE — CONSULT LETTER
[Dear  ___] : Dear  [unfilled], [Please see my note below.] : Please see my note below. [Consult Letter:] : I had the pleasure of evaluating your patient, [unfilled]. [Consult Closing:] : Thank you very much for allowing me to participate in the care of this patient.  If you have any questions, please do not hesitate to contact me. [Sincerely,] : Sincerely, [Ja Farr M.D., FACS, [facility]] : Ja Farr M.D., FACS, [facility] [[facility]] : [facility] [Director, [facility]] : Director, [facility] [[facility]] : [facility]  [Certified in Neck Ultrasound/ ECNU & AIUM] : Certified in Neck Ultrasound/ ECNU & AIUM [] :  [Otolaryngology/Head & Neck Surgery] : Otolaryngology/Head & Neck Surgery [[facility]] : [facility] [FreeTextEntry3] : \par Ja Farr M.D., FACS, ECNU\par Director [facility]\par [facility]\par Certified in Thyroid/Parathyroid/Neck Ultrasound, ECNU/ AIUM\par \par , Department of Otolaryngology\par [facility]\par

## 2019-09-05 LAB
25(OH)D3 SERPL-MCNC: 27.2 NG/ML
BASOPHILS # BLD AUTO: 0.03 K/UL
BASOPHILS NFR BLD AUTO: 0.6 %
EOSINOPHIL # BLD AUTO: 0.04 K/UL
EOSINOPHIL NFR BLD AUTO: 0.8 %
HCT VFR BLD CALC: 41.8 %
HGB BLD-MCNC: 12.9 G/DL
IMM GRANULOCYTES NFR BLD AUTO: 0.2 %
LYMPHOCYTES # BLD AUTO: 2.76 K/UL
LYMPHOCYTES NFR BLD AUTO: 52.5 %
MAN DIFF?: NORMAL
MCHC RBC-ENTMCNC: 30.1 PG
MCHC RBC-ENTMCNC: 30.9 GM/DL
MCV RBC AUTO: 97.4 FL
MONOCYTES # BLD AUTO: 0.39 K/UL
MONOCYTES NFR BLD AUTO: 7.4 %
NEUTROPHILS # BLD AUTO: 2.03 K/UL
NEUTROPHILS NFR BLD AUTO: 38.5 %
PLATELET # BLD AUTO: 238 K/UL
RBC # BLD: 4.29 M/UL
RBC # FLD: 12.3 %
T3FREE SERPL-MCNC: 2.68 PG/ML
T4 FREE SERPL-MCNC: 1.6 NG/DL
THYROGLOB AB SERPL-ACNC: <20 IU/ML
THYROGLOB SERPL-MCNC: <0.2 NG/ML
TSH SERPL-ACNC: 0.96 UIU/ML
WBC # FLD AUTO: 5.26 K/UL

## 2020-08-28 ENCOUNTER — TRANSCRIPTION ENCOUNTER (OUTPATIENT)
Age: 65
End: 2020-08-28

## 2020-10-28 NOTE — HISTORY REVIEWED
Patient Education     Pneumonia (Adult)  Pneumonia is an infection deep within the lungs. It is in the small air sacs (alveoli). Pneumonia may be caused by a virus or bacteria. Pneumonia caused by bacteriaÂ is usually treated with an antibiotic. Severe cases may need to be treated in the hospital. Milder cases can be treated at home. Symptoms usually start to get better during the firstÂ 2 days of treatment. Home care  Follow these guidelines when caring for yourself at home:  Â· Rest at home for the first 2 to 3 days, or until you feel stronger. Donât let yourself get overly tired when you go back to your activities. Â· Stay away from cigarette smoke - yours or other peopleâs. Â· You may use acetaminophen or ibuprofen to control fever or pain, unless another medicine was prescribed. If you have chronic liver or kidney disease, talk with your healthcare provider before using these medicines. Also talk with your provider if youâve had a stomach ulcer or gastrointestinal bleeding. Donât give aspirin to anyone younger than 25years of age who is ill with a fever. It may cause severe liver damage. Â· Your appetite may be poor, so a light diet is fine. Â· Drink 6 to 8 glasses of fluids every day to make sure you are getting enough fluids. Beverages can include water, sport drinks, sodas without caffeine, juices, tea, or soup. Fluids will help loosen secretions in the lung. This will make it easier for you to cough up the phlegm (sputum). If you also have heart or kidney disease, check with your healthcare provider before you drink extra fluids. Â· Take antibiotic medicine prescribed until it is all gone, even if you are feeling better after a few days. Follow-up care  Follow up with your healthcare provider in the next 2 to 3 days, or as advised. This is to be sure the medicine is helping you get better. If you are 72 or older, you should get a pneumococcal vaccine and a yearlyÂ flu (influenza)Â shot.  You should also get these vaccines if you have chronic lung disease like asthma, emphysema, or COPD. Recently, a second type of pneumonia vaccine has become available for everyone over 72years old. This is in addition to the previous vaccine. Ask your provider about this. When to seek medical advice  Call your healthcare provider right away if any of these occur:  Â· You donât get better within the first 48 hours of treatment  Â· Shortness of breath gets worse  Â· Rapid breathing (more than 25 breaths per minute)  Â· Coughing up blood  Â· Chest pain gets worse with breathing  Â· Fever ofÂ 100.4Â°F (38Â°C) or higher that doesnât get better with fever medicine  Â· Weakness, dizziness, or fainting that gets worse  Â· Thirst or dry mouth that gets worse  Â· Sinus pain, headache, or a stiff neck  Â· Chest pain not caused by coughing  Date Last Reviewed: 1/1/2017  Â© 1646-8480 [facility]. [address]. All rights reserved. This information is not intended as a substitute for professional medical care. Always follow your healthcare professional's instructions. [History reviewed] : History reviewed. [Medications and Allergies reviewed] : Medications and allergies reviewed.

## 2022-04-11 PROBLEM — J04.0 REFLUX LARYNGITIS: Status: ACTIVE | Noted: 2019-01-29

## 2024-02-29 NOTE — PATIENT PROFILE ADULT. - HARM RISK FACTORS
[Joint Pain] : joint pain [Joint Stiffness] : joint stiffness [Joint Swelling] : joint swelling [Negative] : Heme/Lymph yes